# Patient Record
Sex: MALE | Race: WHITE
[De-identification: names, ages, dates, MRNs, and addresses within clinical notes are randomized per-mention and may not be internally consistent; named-entity substitution may affect disease eponyms.]

---

## 2018-05-11 ENCOUNTER — HOSPITAL ENCOUNTER (EMERGENCY)
Dept: HOSPITAL 97 - ER | Age: 38
Discharge: HOME | End: 2018-05-11
Payer: COMMERCIAL

## 2018-05-11 DIAGNOSIS — B34.9: Primary | ICD-10-CM

## 2018-05-11 DIAGNOSIS — F17.210: ICD-10-CM

## 2018-05-11 PROCEDURE — 71045 X-RAY EXAM CHEST 1 VIEW: CPT

## 2018-05-11 PROCEDURE — 87804 INFLUENZA ASSAY W/OPTIC: CPT

## 2018-05-11 PROCEDURE — 99283 EMERGENCY DEPT VISIT LOW MDM: CPT

## 2018-05-11 NOTE — EDPHYS
Physician Documentation                                                                           

 [facility]                                                                

Name: Brittany Sarmiento                                                                                

Age: 38 yrs                                                                                       

Sex: Male                                                                                         

: 1980                                                                                   

MRN: [id number]                                                                                   

Arrival Date: 2018                                                                          

Time: 07:01                                                                                       

Account#: [id number]                                                                            

Bed 15                                                                                            

Private MD:                                                                                       

ED Physician Landry Kelley                                                                         

HPI:                                                                                              

                                                                                             

07:44 This 38 yrs old  Male presents to ER via Ambulatory with complaints of Fever.  jr8 

07:44 The patient reports fever, not measured (subjective). Onset: The symptoms/episode       jr8 

      began/occurred acutely, yesterday. Modifying factors: there are no obvious modifying        

      factors. Associated signs and symptoms: Pertinent positives: cough, headache. Severity      

      of symptoms: At their worst the symptoms were mild in the emergency department the          

      symptoms are unchanged. The patient has not experienced similar symptoms in the past.       

      The patient has not recently seen a physician.                                              

                                                                                                  

Historical:                                                                                       

- Allergies:                                                                                      

07:21 NKA;                                                                                    iw  

- Home Meds:                                                                                      

07:21 None [Active];                                                                          iw  

- PMHx:                                                                                           

07:21 None;                                                                                   iw  

- PSHx:                                                                                           

07:21 Appendectomy; Hernia repair;                                                            iw  

                                                                                                  

- Immunization history:: Adult Immunizations not up to date.                                      

- Social history:: Smoking status: Patient uses tobacco products, smokes one pack                 

  cigarettes per day.                                                                             

                                                                                                  

                                                                                                  

ROS:                                                                                              

07:44 Eyes: Negative for injury, pain, redness, and discharge, ENT: Negative for injury,      jr8 

      pain, and discharge, Neck: Negative for injury, pain, and swelling, Cardiovascular:         

      Negative for chest pain, palpitations, and edema, Abdomen/GI: Negative for abdominal        

      pain, nausea, vomiting, diarrhea, and constipation, Back: Negative for injury and pain,     

      MS/Extremity: Negative for injury and deformity, Skin: Negative for injury, rash, and       

      discoloration.                                                                              

07:44 Constitutional: Positive for fever, Negative for body aches, chills, fatigue, malaise,      

      poor PO intake.                                                                             

07:44 Respiratory: Positive for cough, Negative for dyspnea on exertion, shortness of breath,     

      sputum production, wheezing.                                                                

07:44 Neuro: Positive for headache, Negative for altered mental status, dizziness, gait           

      disturbance, hearing loss, loss of consciousness, numbness, seizure activity, speech        

      changes, syncope, near syncope, tingling, tinnitus, tremor, visual changes, weakness.       

                                                                                                  

Exam:                                                                                             

07:44 Head/Face:  Normocephalic, atraumatic. Eyes:  Pupils equal round and reactive to light, jr8 

      extra-ocular motions intact.  Lids and lashes normal.  Conjunctiva and sclera are           

      non-icteric and not injected.  Cornea within normal limits.  Periorbital areas with no      

      swelling, redness, or edema. ENT:  Nares patent. No nasal discharge, no septal              

      abnormalities noted.  Tympanic membranes are normal and external auditory canals are        

      clear.  Oropharynx with no redness, swelling, or masses, exudates, or evidence of           

      obstruction, uvula midline.  Mucous membranes moist. Neck:  Trachea midline, no             

      thyromegaly or masses palpated, and no cervical lymphadenopathy.  Supple, full range of     

      motion without nuchal rigidity, or vertebral point tenderness.  No Meningismus.             

      Cardiovascular:  Regular rate and rhythm with a normal S1 and S2.  No gallops, murmurs,     

      or rubs.  Normal PMI, no JVD.  No pulse deficits. Respiratory:  Lungs have equal breath     

      sounds bilaterally, clear to auscultation and percussion.  No rales, rhonchi or wheezes     

      noted.  No increased work of breathing, no retractions or nasal flaring. Abdomen/GI:        

      Soft, non-tender, with normal bowel sounds.  No distension or tympany.  No guarding or      

      rebound.  No evidence of tenderness throughout. Back:  No spinal tenderness.  No            

      costovertebral tenderness.  Full range of motion. Skin:  Warm, dry with normal turgor.      

      Normal color with no rashes, no lesions, and no evidence of cellulitis. MS/ Extremity:      

      Pulses equal, no cyanosis.  Neurovascular intact.  Full, normal range of motion. Neuro:     

       Awake and alert, GCS 15, oriented to person, place, time, and situation.  Cranial          

      nerves II-XII grossly intact.  Motor strength 5/5 in all extremities.  Sensory grossly      

      intact.  Cerebellar exam normal.  Normal gait.                                              

                                                                                                  

Vital Signs:                                                                                      

07:21  / 88; Pulse 81; Resp 16 S; Temp 98.0; Pulse Ox 98% on R/A; Weight 104.33 kg;     iw  

      Height 5 ft. 11 in. (180.34 cm); Pain 8/10;                                                 

07:21 Body Mass Index 32.08 (104.33 kg, 180.34 cm)                                            iw  

                                                                                                  

MDM:                                                                                              

07:14 Patient medically screened.                                                             jr8 

08:02 Differential diagnosis: viral Infection, bacterial infection, URI, bronchitis,          jr8 

      pneumonia. Data reviewed: vital signs, nurses notes, lab test result(s), radiologic         

      studies, plain films, and as a result, I will discharge patient. Data interpreted:          

      Pulse oximetry: on room air is 98 %. Interpretation: normal. Counseling: I had a            

      detailed discussion with the patient and/or guardian regarding: the historical points,      

      exam findings, and any diagnostic results supporting the discharge/admit diagnosis, lab     

      results, radiology results, the need for outpatient follow up, a family practitioner,       

      to return to the emergency department if symptoms worsen or persist or if there are any     

      questions or concerns that arise at home.                                                   

08:05 ED course: Detailed discussion with patient that there are no signs for acute bacterial jr8 

      infection. Most likely viral illness at this time. Recommend symptomatic treatment.         

      Patient good with this and will continue OTC medication .                                   

                                                                                                  

                                                                                             

07:27 Order name: Influenza Screen (a \T\ B); Complete Time: 08:02                              jr8

                                                                                             

07:27 Order name: XRAY CXR (1 view); Complete Time: 16:45                                     jr8 

                                                                                                  

Administered Medications:                                                                         

No medications were administered                                                                  

                                                                                                  

                                                                                                  

Disposition:                                                                                      

18 08:05 Discharged to Home. Impression: Viral infection, unspecified.                      

- Condition is Stable.                                                                            

- Discharge Instructions: Antibiotic Resistance, Viral Infections.                                

                                                                                                  

- Medication Reconciliation Form, Thank You Letter, Antibiotic Education, Prescription            

  Opioid Use, Work release form form.                                                             

- Follow up: Private Physician; When: 1 week; Reason: If symptoms return, Recheck                 

  today's complaints, Continuance of care, Re-evaluation by your physician.                       

- Problem is new.                                                                                 

- Symptoms have improved.                                                                         

                                                                                                  

                                                                                                  

                                                                                                  

Addendum:                                                                                         

2018                                                                                        

     20:56 Co-signature as Attending Physician, Landry Kelley MD.                                    r
n

                                                                                                  

Signatures:                                                                                       

Dispatcher [employer]                           EDLeilani Taylor RN RN iw Nieto, Roman, MD MD rn Roszak, Josh, PA                        PA   jr8                                                  

Shauna Roberts RN RN   ph                                                   

                                                                                                  

Corrections: (The following items were deleted from the chart)                                    

                                                                                             

09:00 08:05 2018 08:05 Discharged to Home. Impression: Viral infection, unspecified.    ph  

      Condition is Stable. Forms are Medication Reconciliation Form, Thank You Letter,            

      Antibiotic Education, Prescription Opioid Use. Follow up: Private Physician; When: 1        

      week; Reason: If symptoms return, Recheck today's complaints, Continuance of care,          

      Re-evaluation by your physician. Problem is new. Symptoms have improved. jr8                

                                                                                                  

**************************************************************************************************

## 2018-05-11 NOTE — RAD REPORT
EXAM DESCRIPTION:  RAD - Chest Single View - 5/11/2018 8:03 am

 

CLINICAL HISTORY:  Cough and fever

 

COMPARISON:  None.

 

FINDINGS:  Portable technique limits examination quality.

 

The lungs are grossly clear. The heart is normal in size. No displaced fractures.

 

IMPRESSION:  No acute intrathoracic process suspected.

## 2018-05-11 NOTE — ER
Nurse's Notes                                                                                     

 [facility]                                                                

Name: Brittany Sarmiento                                                                                

Age: 38 yrs                                                                                       

Sex: Male                                                                                         

: 1980                                                                                   

MRN: [id number]                                                                                   

Arrival Date: 2018                                                                          

Time: 07:01                                                                                       

Account#: [id number]                                                                            

Bed 15                                                                                            

Private MD:                                                                                       

Diagnosis: Viral infection, unspecified                                                           

                                                                                                  

Presentation:                                                                                     

                                                                                             

07:19 Presenting complaint: Patient states: has had fever, body aches, cough since Wednesday, iw  

      has been taking NyQuil at home. Transition of care: patient was not received from           

      another setting of care. Onset of symptoms was May 11, 2018. Initial Sepsis Screen:         

      Does the patient meet any 2 criteria? No. Patient's initial sepsis screen is negative.      

      Does the patient have a suspected source of infection? No. Patient's initial sepsis         

      screen is negative. Care prior to arrival: None.                                            

07:19 Method Of Arrival: Ambulatory                                                           iw  

07:19 Acuity: PONCHO 4                                                                           iw  

                                                                                                  

Historical:                                                                                       

- Allergies:                                                                                      

07:21 NKA;                                                                                    iw  

- Home Meds:                                                                                      

07:21 None [Active];                                                                          iw  

- PMHx:                                                                                           

07:21 None;                                                                                   iw  

- PSHx:                                                                                           

07:21 Appendectomy; Hernia repair;                                                            iw  

                                                                                                  

- Immunization history:: Adult Immunizations not up to date.                                      

- Social history:: Smoking status: Patient uses tobacco products, smokes one pack                 

  cigarettes per day.                                                                             

                                                                                                  

                                                                                                  

Screenin:52 Abuse screen: Denies threats or abuse. Denies injuries from another. Nutritional        ph  

      screening: No deficits noted. Tuberculosis screening: No symptoms or risk factors           

      identified. Fall Risk None identified.                                                      

                                                                                                  

Assessment:                                                                                       

07:50 General: Appears in no apparent distress. comfortable, well groomed, Behavior is calm,  ph  

      cooperative, appropriate for age, Reports fever for 1-2 days. Pain: Complains of pain       

      in chest. Neuro: Level of Consciousness is awake, alert, obeys commands, Oriented to        

      person, place, time, situation. Cardiovascular: Capillary refill < 3 seconds in             

      bilateral fingers Patient's skin is warm and dry. Respiratory: Reports cough that is        

      non-productive, persistent pain with cough Airway is patent Respiratory effort is even,     

      unlabored, Respiratory pattern is regular, symmetrical, Breath sounds are clear             

      bilaterally. GI: Reports vomiting, on Wed. EENT: Denies nasal congestion, nasal             

      discharge. Derm: Skin is intact, is healthy with good turgor, Skin is pink, warm \T\ dry.   

      Musculoskeletal: Circulation, motion, and sensation intact. Range of motion: intact in      

      all extremities.                                                                            

                                                                                                  

Vital Signs:                                                                                      

07:21  / 88; Pulse 81; Resp 16 S; Temp 98.0; Pulse Ox 98% on R/A; Weight 104.33 kg;     iw  

      Height 5 ft. 11 in. (180.34 cm); Pain 8/10;                                                 

07:21 Body Mass Index 32.08 (104.33 kg, 180.34 cm)                                            iw  

                                                                                                  

ED Course:                                                                                        

07:01 Patient arrived in ED.                                                                  ds1 

07:11 Shauna Roberts, RN is Primary Nurse.                                                    ph  

07:14 Lior Simon PA is PHCP.                                                               jr8 

07:14 Landry Kelley MD is Attending Physician.                                                jr8 

07:20 Triage completed.                                                                       iw  

07:21 Arm band placed on.                                                                     iw  

07:52 Patient has correct armband on for positive identification. Bed in low position. Call   ph  

      light in reach. Side rails up X 1. Pulse ox on. NIBP on.                                    

07:52 No provider procedures requiring assistance completed. Patient did not have IV access   ph  

      during this emergency room visit.                                                           

08:02 X-ray completed. Portable x-ray completed in exam room.                                 jb2 

08:03 XRAY CXR (1 view) In Process Unspecified.                                               EDMS

                                                                                                  

Administered Medications:                                                                         

No medications were administered                                                                  

                                                                                                  

                                                                                                  

Outcome:                                                                                          

08:05 Discharge ordered by MD.                                                                jr8 

09:00 Discharged to home ambulatory.                                                          ph  

09:00 Condition: good                                                                             

09:00 Discharge instructions given to patient, Instructed on discharge instructions, follow       

      up and referral plans. Demonstrated understanding of instructions, follow-up care.          

09:00 Patient left the ED.                                                                    ph  

                                                                                                  

Signatures:                                                                                       

Dispatcher [employer]                           EDMS                                                 

Dong Subramanian                              jb2                                                  

Татьяна Walters                                ds1                                                  

Leilani Dias, TIERA                     RN   iw                                                   

Lior Simon PA PA   jr8                                                  

Shauna Roberts RN                      RN   ph                                                   

                                                                                                  

**************************************************************************************************